# Patient Record
Sex: MALE | Race: ASIAN
[De-identification: names, ages, dates, MRNs, and addresses within clinical notes are randomized per-mention and may not be internally consistent; named-entity substitution may affect disease eponyms.]

---

## 2018-03-23 ENCOUNTER — HOSPITAL ENCOUNTER (EMERGENCY)
Dept: HOSPITAL 45 - C.EDB | Age: 23
Discharge: HOME | End: 2018-03-23
Payer: SELF-PAY

## 2018-03-23 VITALS
SYSTOLIC BLOOD PRESSURE: 128 MMHG | HEART RATE: 84 BPM | DIASTOLIC BLOOD PRESSURE: 81 MMHG | OXYGEN SATURATION: 96 % | TEMPERATURE: 98.96 F

## 2018-03-23 VITALS
HEIGHT: 70 IN | HEIGHT: 70 IN | WEIGHT: 217.6 LBS | WEIGHT: 217.6 LBS | BODY MASS INDEX: 31.15 KG/M2 | BODY MASS INDEX: 31.15 KG/M2
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
## 2018-03-23 DIAGNOSIS — H11.31: ICD-10-CM

## 2018-03-23 DIAGNOSIS — S05.91XA: Primary | ICD-10-CM
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
